# Patient Record
Sex: FEMALE | URBAN - METROPOLITAN AREA
[De-identification: names, ages, dates, MRNs, and addresses within clinical notes are randomized per-mention and may not be internally consistent; named-entity substitution may affect disease eponyms.]

---

## 2024-11-18 ENCOUNTER — OFFICE VISIT (OUTPATIENT)
Dept: URBAN - METROPOLITAN AREA CLINIC 78 | Facility: CLINIC | Age: 54
End: 2024-11-18
Payer: COMMERCIAL

## 2024-11-18 ENCOUNTER — DASHBOARD ENCOUNTERS (OUTPATIENT)
Age: 54
End: 2024-11-18

## 2024-11-18 VITALS
HEIGHT: 63 IN | BODY MASS INDEX: 23.21 KG/M2 | TEMPERATURE: 97.8 F | WEIGHT: 131 LBS | DIASTOLIC BLOOD PRESSURE: 80 MMHG | SYSTOLIC BLOOD PRESSURE: 133 MMHG | HEART RATE: 73 BPM

## 2024-11-18 DIAGNOSIS — Z12.11 COLON CANCER SCREENING: ICD-10-CM

## 2024-11-18 DIAGNOSIS — R14.0 BLOATING: ICD-10-CM

## 2024-11-18 DIAGNOSIS — K59.09 CHRONIC CONSTIPATION: ICD-10-CM

## 2024-11-18 PROCEDURE — 99244 OFF/OP CNSLTJ NEW/EST MOD 40: CPT | Performed by: INTERNAL MEDICINE

## 2024-11-18 PROCEDURE — 99204 OFFICE O/P NEW MOD 45 MIN: CPT | Performed by: INTERNAL MEDICINE

## 2024-11-18 RX ORDER — GABAPENTIN 100 MG/1
TAKE 1 CAPSULE BY MOUTH TWICE DAILY FOR SCIATICA CAPSULE ORAL
Qty: 60 EACH | Refills: 0 | Status: ON HOLD | COMMUNITY

## 2024-11-18 NOTE — HPI-TODAY'S VISIT:
The patient was referred to us by Catherine Ro NP for a screening colonoscopy. A copy of this note will be sent to the referring physician.   The patient has never had a colonoscopy previously. There is no FH of colon cancer or colon polyps.   There is no recent history of rectal bleeding. The patient has no pertinent additional complaints of abdominal pain,  diarrhea, anorexia or unintentional weight loss.  She has chronic constipation. She is cogniscent of drinking enough water and eating a high fiber diet. She at times will drink a laxative tea. She does experience bloating and discomfort when she has not had a BM in several days.   Regarding any upper GI complaints, the patient has not had heartburn, nausea, vomiting or dysphagia.  The patient does not take blood thinners.  They deny any CP or HOUSER.  The patient is not on medications for weight loss.  Summary of prior workup: - Labs on 8/13/2024: WBC 5.5, hemoglobin 12.4, MCV 85, platelets 281, glucose 89, BUN 21, creatinine 0.7, sodium 138, potassium 4.2, calcium 9.8total protein 7.2, albumin 4.5, total bilirubin 0.3, alkaline phosphatase 79, AST 22, ALT 22.  Total cholesterol 216, triglycerides 158.  Hemoglobin A1c 5.5%.  TSH 2.78.

## 2025-01-15 ENCOUNTER — TELEPHONE ENCOUNTER (OUTPATIENT)
Dept: URBAN - METROPOLITAN AREA CLINIC 78 | Facility: CLINIC | Age: 55
End: 2025-01-15

## 2025-01-16 ENCOUNTER — OFFICE VISIT (OUTPATIENT)
Dept: URBAN - METROPOLITAN AREA LAB 1 | Facility: LAB | Age: 55
End: 2025-01-16
Payer: COMMERCIAL

## 2025-01-16 DIAGNOSIS — Z12.11 COLON CANCER SCREENING: ICD-10-CM

## 2025-01-16 PROCEDURE — 0528F RCMND FLW-UP 10 YRS DOCD: CPT | Performed by: INTERNAL MEDICINE

## 2025-01-16 PROCEDURE — G0121 COLON CA SCRN NOT HI RSK IND: HCPCS | Performed by: INTERNAL MEDICINE

## 2025-01-16 RX ORDER — GABAPENTIN 100 MG/1
TAKE 1 CAPSULE BY MOUTH TWICE DAILY FOR SCIATICA CAPSULE ORAL
Qty: 60 EACH | Refills: 0 | Status: ON HOLD | COMMUNITY